# Patient Record
Sex: MALE | Race: BLACK OR AFRICAN AMERICAN | NOT HISPANIC OR LATINO | URBAN - METROPOLITAN AREA
[De-identification: names, ages, dates, MRNs, and addresses within clinical notes are randomized per-mention and may not be internally consistent; named-entity substitution may affect disease eponyms.]

---

## 2024-10-23 ENCOUNTER — OFFICE VISIT (OUTPATIENT)
Dept: URGENT CARE | Facility: CLINIC | Age: 44
End: 2024-10-23
Payer: COMMERCIAL

## 2024-10-23 VITALS
TEMPERATURE: 99 F | WEIGHT: 261 LBS | OXYGEN SATURATION: 97 % | RESPIRATION RATE: 20 BRPM | HEIGHT: 76 IN | BODY MASS INDEX: 31.78 KG/M2 | HEART RATE: 80 BPM | DIASTOLIC BLOOD PRESSURE: 78 MMHG | SYSTOLIC BLOOD PRESSURE: 141 MMHG

## 2024-10-23 DIAGNOSIS — I50.20 SYSTOLIC CONGESTIVE HEART FAILURE, UNSPECIFIED HF CHRONICITY: ICD-10-CM

## 2024-10-23 DIAGNOSIS — Z86.711 HISTORY OF PULMONARY EMBOLISM: Primary | ICD-10-CM

## 2024-10-23 DIAGNOSIS — R07.89 OTHER CHEST PAIN: ICD-10-CM

## 2024-10-23 DIAGNOSIS — I10 PRIMARY HYPERTENSION: ICD-10-CM

## 2024-10-23 PROCEDURE — 99214 OFFICE O/P EST MOD 30 MIN: CPT | Mod: S$GLB,,, | Performed by: FAMILY MEDICINE

## 2024-10-23 RX ORDER — APIXABAN 5 MG/1
5 TABLET, FILM COATED ORAL 2 TIMES DAILY
Qty: 60 TABLET | Refills: 3 | Status: SHIPPED | OUTPATIENT
Start: 2024-10-23 | End: 2024-11-22

## 2024-10-23 RX ORDER — APIXABAN 5 MG (74)
KIT ORAL
COMMUNITY
Start: 2024-05-09

## 2024-10-23 RX ORDER — APIXABAN 5 MG/1
TABLET, FILM COATED ORAL
COMMUNITY
Start: 2024-08-20 | End: 2024-10-23 | Stop reason: SDUPTHER

## 2024-10-23 NOTE — PATIENT INSTRUCTIONS
Refilled your Eliquis at the recorded maintenance dose of 5mg twice daily.      Please follow up closely with your primary care physician as well as cardiologist and pulmonologist.      If you have any recurring chest pains or any shortness of breath or any other worrisome symptoms please go to the ER immediately for further evaluation and management.      If you have any significant bleeding in the stool or urine or any other significant bleeding please stop your Eliquis and crit of the nearest ER.

## 2024-10-23 NOTE — PROGRESS NOTES
"Subjective:      Patient ID: Rajesh Conn is a 44 y.o. male.    Vitals:  height is 6' 4" (1.93 m) and weight is 118.4 kg (261 lb). His oral temperature is 98.5 °F (36.9 °C). His blood pressure is 141/78 (abnormal) and his pulse is 80. His respiration is 20 and oxygen saturation is 97%.     Chief Complaint: Medication Refill    43 yo male needs a prescription refill for his eliquis, he takes 5mg BID after dx with saddle PE 5/2024 s/p embolectomy. . Pt is on an extended work contract here for 4 months and ran out of [employer]. Resides in [place]. Was told he should be on AC lifelong. In [facility] states pt with chest pain but he denies CP, denies sob. No fever, no cough. Yesterday he had a twinge of pain in right upper chest wall. Brief, now resolved.       Medication Refill  This is a new problem. The current episode started yesterday. The problem occurs constantly. The problem has been unchanged. Nothing aggravates the symptoms.     ROS   Objective:     Physical Exam  Constitutional: Pt oriented to person, place, and time.  Non-toxic appearance.   Patient does not appear ill. No distress. normal  HENT: No icterus or facial swelling appreciated  Head: Normocephalic and atraumatic.   Nose: No congestion.   CV:  S1/S2/regular rate rhythm.  No murmurs appreciated  Pulmonary/Chest: Effort normal. No stridor. No respiratory distress.  Clear to auscultation bilaterally.  No cough.  No wheezes rales or rhonchi  Abdominal: Normal appearance. Abdomen exhibits no distension.   Musculoskeletal:         General: No swelling.   Neurological: no focal deficit. Patient is alert and oriented to person, place, and time.   Skin: Skin is not diaphoretic and not pale. no jaundice  Psychiatric: Patients behavior is normal. Mood, judgment and thought content normal.       Assessment:     1. History of pulmonary embolism    2. Other chest pain    3. Primary hypertension    4. Systolic congestive heart failure, unspecified HF chronicity  "       Plan:       History of pulmonary embolism  Comments:  embolectomy  Orders:  -     ELIQUIS 5 mg Tab; Take 1 tablet (5 mg total) by mouth 2 (two) times daily.  Dispense: 60 tablet; Refill: 3    Other chest pain  -     IN OFFICE EKG 12-LEAD (to [place])-- pt declined EKG    Primary hypertension  Patient reports off antihypertensives.  States blood pressure is better controlled outside of office visits.    Systolic congestive heart failure, unspecified HF chronicity  Comments:  EF 40-45% in may 2024    Patient Instructions   Refilled your Eliquis at the recorded maintenance dose of 5mg twice daily.      Please follow up closely with your primary care physician as well as cardiologist and pulmonologist.      If you have any recurring chest pains or any shortness of breath or any other worrisome symptoms please go to the ER immediately for further evaluation and management.      If you have any significant bleeding in the stool or urine or any other significant bleeding please stop your Eliquis and crit of the nearest ER.